# Patient Record
Sex: MALE | Race: BLACK OR AFRICAN AMERICAN
[De-identification: names, ages, dates, MRNs, and addresses within clinical notes are randomized per-mention and may not be internally consistent; named-entity substitution may affect disease eponyms.]

---

## 2019-04-22 ENCOUNTER — TRANSCRIPTION ENCOUNTER (OUTPATIENT)
Age: 34
End: 2019-04-22

## 2019-04-22 ENCOUNTER — APPOINTMENT (OUTPATIENT)
Dept: FAMILY MEDICINE | Facility: CLINIC | Age: 34
End: 2019-04-22
Payer: SELF-PAY

## 2019-04-22 VITALS
HEIGHT: 69 IN | SYSTOLIC BLOOD PRESSURE: 130 MMHG | WEIGHT: 159 LBS | TEMPERATURE: 98.9 F | BODY MASS INDEX: 23.55 KG/M2 | HEART RATE: 77 BPM | DIASTOLIC BLOOD PRESSURE: 90 MMHG | OXYGEN SATURATION: 98 %

## 2019-04-22 DIAGNOSIS — Z87.891 PERSONAL HISTORY OF NICOTINE DEPENDENCE: ICD-10-CM

## 2019-04-22 DIAGNOSIS — Z82.69 FAMILY HISTORY OF OTHER DISEASES OF THE MUSCULOSKELETAL SYSTEM AND CONNECTIVE TISSUE: ICD-10-CM

## 2019-04-22 DIAGNOSIS — D64.9 ANEMIA, UNSPECIFIED: ICD-10-CM

## 2019-04-22 DIAGNOSIS — Z78.9 OTHER SPECIFIED HEALTH STATUS: ICD-10-CM

## 2019-04-22 PROBLEM — Z00.00 ENCOUNTER FOR PREVENTIVE HEALTH EXAMINATION: Status: ACTIVE | Noted: 2019-04-22

## 2019-04-22 PROCEDURE — 99213 OFFICE O/P EST LOW 20 MIN: CPT

## 2019-04-22 RX ORDER — MELOXICAM 15 MG/1
15 TABLET ORAL
Qty: 14 | Refills: 0 | Status: ACTIVE | COMMUNITY
Start: 2019-04-22 | End: 1900-01-01

## 2019-04-22 RX ORDER — TRAMADOL HYDROCHLORIDE 50 MG/1
50 TABLET, COATED ORAL
Qty: 10 | Refills: 0 | Status: ACTIVE | COMMUNITY
Start: 2019-04-22 | End: 1900-01-01

## 2019-04-22 NOTE — PHYSICAL EXAM
[Well Nourished] : well nourished [No Acute Distress] : no acute distress [Clear to Auscultation] : lungs were clear to auscultation bilaterally [Well Developed] : well developed [Regular Rhythm] : with a regular rhythm [Normal Rate] : normal rate  [Normal Affect] : the affect was normal [Normal S1, S2] : normal S1 and S2 [Normal Mood] : the mood was normal [de-identified] : Tenderness with palpation to the lower anterior ribs on the right

## 2019-04-22 NOTE — HISTORY OF PRESENT ILLNESS
[FreeTextEntry1] : ER follow-up [de-identified] : Mr. KARUNA JAMES is a 33 year old male here today for 1-2 month hx of right anterior chest wall pain. Says pain at rest is 6-7 /10 and when lying down is 10+/10. No injuries to the area. No SOB/cough. Had a 2-3 week hx of URI sxs that resolved about 1 month before the pain started.Seen in ER twice. Has had EKG, CXR and CT of the chest- all of which were normal. \par CBC in ER revealed normocytic normochromic anemia. Patient unaware of a hx of anemia. No SS in his family. Says he had some dark stools in the past- resolved.

## 2019-05-02 ENCOUNTER — APPOINTMENT (OUTPATIENT)
Dept: PAIN MANAGEMENT | Facility: CLINIC | Age: 34
End: 2019-05-02
Payer: SELF-PAY

## 2019-05-02 VITALS
HEIGHT: 69 IN | DIASTOLIC BLOOD PRESSURE: 90 MMHG | SYSTOLIC BLOOD PRESSURE: 102 MMHG | BODY MASS INDEX: 23.55 KG/M2 | WEIGHT: 159 LBS

## 2019-05-02 DIAGNOSIS — M47.24 OTHER SPONDYLOSIS WITH RADICULOPATHY, THORACIC REGION: ICD-10-CM

## 2019-05-02 DIAGNOSIS — R07.89 OTHER CHEST PAIN: ICD-10-CM

## 2019-05-02 PROCEDURE — 99244 OFF/OP CNSLTJ NEW/EST MOD 40: CPT

## 2019-05-02 NOTE — PHYSICAL EXAM
[de-identified] : Constitutional: Well-developed, in no acute distress\par Eyes: Pupil- Right: normal, Left: normal\par Neck exam: Inspection normal\par Respiratory: Normal effort, speaking in full sentences\par Cardiovascular: Regular rate and rhythm\par Vascular: Dorsal pedis pulses normal and equal bilaterally\par Abdomen: Inspection normal, no distension\par Skin: Inspection normal, no bruising noted\par Musculoskeletal:\par Lumbar Spine:   Gait: Antalgic\par 		Inspection: Normal curvature, no abnormal kyphosis or scoliosis\par 		Facet loading: pain bilaterally\par 		Palpation:\par 			Lumbar and paraspinal muscles: pain bilaterally\par 			Sacroiliac joint: no pain\par 			Greater trochanter: no pain\par 		Muscle Strength:\par 		Iliopsoas: 5/5 bilaterally\par 		Quadriceps: 5/5 bilaterally\par 		Hamstrings: 5/5 bilaterally\par 		Tibialis anterior: 5/5 bilaterally\par 		Extensor hallucis longus: 5/5 bilaterally\par \par 		Sensation: normal and equal in bilateral lower extremities\par \par 		Reflexes:\par 			Patellar reflex: 2+ bilaterally\par 			Ankle jerk reflex: 2+ bilaterally\par 		\par 		Straight leg raise: negative bilaterally\par \par Extremity: no edema noted\par Neurological: Memory normal, AAO x 3, Cranial nerves II - XII grossly normal\par Psychiatric: Appropriate mood and affect, oriented to time, place, person, and situation\par \par \par

## 2019-05-02 NOTE — CONSULT LETTER
[Dear  ___] : Dear  [unfilled], [Consult Letter:] : I had the pleasure of evaluating your patient, [unfilled]. [Please see my note below.] : Please see my note below. [Consult Closing:] : Thank you very much for allowing me to participate in the care of this patient.  If you have any questions, please do not hesitate to contact me. [Sincerely,] : Sincerely, [FreeTextEntry3] : José Miguel Dominguez MD\par

## 2019-05-02 NOTE — HISTORY OF PRESENT ILLNESS
[Constant] : constant [7] : a maximum pain level of 7/10 [Sharp] : sharp [Dull] : dull [Laying] : laying [FreeTextEntry1] : 33 yom presents w/ severe right-sided chest wall pain. His pain begin a few months ago after a severe cold where he was coughing a lot. Currently his pain is unbearable. Pain is 7/10 in intensity. His quality of life is significantly impaired. Pain is worse with coughing and lying flat. There is no pain on the left side. The chest wall is tender to the touch. Pain is described as sharp and dull. No other recent changes in health. He is taking tramadol and Mobic with minimal relief. No relief with all other conservative measures. [FreeTextEntry7] : Chest pain [de-identified] : Pain is morning, night and when changing positions [FreeTextEntry3] : touch, cough,breathing [FreeTextEntry4] : none

## 2019-05-02 NOTE — ASSESSMENT
[FreeTextEntry1] : 33 yom presents w/ severe right-sided chest wall pain which began a few months ago after a severe cold where he was coughing a lot. Currently his pain is unbearable. His quality of life is significantly impaired. Pain is worse with coughing and lying flat. He has failed to have relief with therapy, NSAIDs, and opioids. Given his failure to improve with all the conservative measures, recommend MRI of the thoracic spine. I do believe that the patient's pain is most consistent with thoracic radiculopathy. Patient will return to review imaging and plantar potential intervention, likely epidural steroid injection.

## 2019-05-28 ENCOUNTER — INBOUND DOCUMENT (OUTPATIENT)
Age: 34
End: 2019-05-28

## 2019-09-30 ENCOUNTER — RX RENEWAL (OUTPATIENT)
Age: 34
End: 2019-09-30

## 2019-09-30 RX ORDER — GABAPENTIN 300 MG/1
300 CAPSULE ORAL 3 TIMES DAILY
Qty: 90 | Refills: 2 | Status: ACTIVE | COMMUNITY
Start: 2019-05-02 | End: 1900-01-01